# Patient Record
(demographics unavailable — no encounter records)

---

## 2018-07-11 NOTE — EMERGENCY ROOM REPORT
History of Present Illness


General


Chief Complaint:  Lower Back Pain or Injury


Source:  Patient





Present Illness


HPI


22-year-old male presents emergency department complaining of acute onset 10 

out of 10 in severity low back pain 1 week.  Patient states that he was at the 

gym doing arm curls and after he put the weights down he felt sharp pain in his 

back.  Patient states pain is worse when he is slightly bent forward Or sitting 

for long period of time.  Patient denies previous injury to this area of his 

back.  Patient states the pain does not radiate down his leg but it does 

radiate into the buttocks bilaterally.  Denies numbness tingling or loss of 

sensation or gross motor movements of the extremities, incontinence of bowel or 

bladder. . Denies Hematuria, CP, Palpitations, LOC, AMS, dizziness, Changes in 

Vision, weakness or a sudden severe headache.


Allergies:  


Coded Allergies:  


     Shrimp (Verified  Allergy, Intermediate, swollen lips, itch, 3/2/14)





Patient History


Past Medical History:  see triage record


Past Surgical History:  none


Pertinent Family History:  none


Immunizations:  UTD


Reviewed Nursing Documentation:  PMH: Agreed; PSxH: Agreed





Nursing Documentation-PMH


Past Medical History:  No History, Except For


Hx Asthma:  Yes





Review of Systems


All Other Systems:  negative except mentioned in HPI





Physical Exam





Vital Signs








  Date Time  Temp Pulse Resp B/P (MAP) Pulse Ox O2 Delivery O2 Flow Rate FiO2


 


7/11/18 17:15 98.2 82 18 122/5 96 Room Air  





 98.2       








Sp02 EP Interpretation:  reviewed, normal


General Appearance:  alert, GCS 15, non-toxic, moderate distress


Head:  normocephalic, atraumatic


ENT:  hearing grossly normal, normal voice


Neck:  full range of motion


Respiratory:  lungs clear, normal breath sounds, speaking full sentences


Cardiovascular #1:  regular rate, rhythm


Genitourinary:  normal inspection, no CVA tenderness


Musculoskeletal:  back normal, gait/station normal, normal range of motion, 

tender - * Mild  Tenderness to palpation to paraspinal muscles of the Lumbar 

area and upper gluteal area bilaterally,  with no obvious midline tenderness.


Neurologic:  alert, oriented x3, responsive, motor strength/tone normal, 

sensory intact, speech normal, grossly normal


Psychiatric:  judgement/insight normal


Skin:  normal color, no rash, warm/dry, well hydrated





Medical Decision Making


PA Attestation


Dr. malin is my supervising Physician whom patient management has been 

discussed with.


Diagnostic Impression:  


 Primary Impression:  


 Lumbosacral pain


 Additional Impression:  


 Lumbosacral strain


 Qualified Codes:  S39.012A - Strain of muscle, fascia and tendon of lower back

, initial encounter


ER Course


22-year-old male presents emergency department complaining of acute onset 10 

out of 10 in severity low back pain 1 week.  Patient states that he was at the 

gym doing arm curls and after he put the weights down he felt sharp pain in his 

back.  Patient states pain is worse when he is slightly bent forward Or sitting 

for long period of time.  Patient denies previous injury to this area of his 

back.  Patient states the pain does not radiate down his leg but it does 

radiate into the buttocks bilaterally.  Denies numbness tingling or loss of 

sensation or gross motor movements of the extremities, incontinence of bowel or 

bladder. . Denies Hematuria, CP, Palpitations, LOC, AMS, dizziness, Changes in 

Vision, weakness or a sudden severe headache. 





Ddx considered: epidural abscess, fracture, sprain/strain, meningitis, spinal 

chord injury, sciatica, cauda equina, Pyelonephritis, renal calculi just to 

name a few.  





Vital signs reviewed and are WNL during ED visit.





Pt. is afebrile with no signs of infection


No new symptoms, and denies recent trauma.


No saddle anesthesia noted, Pt. denies incontinence 


Neurovascular is intact


ROM is limited due to pain


* Mild  Tenderness to palpation to paraspinal muscles of the Lumbar area and 

upper gluteal area bilaterally,  with no obvious midline tenderness. 


*Pt. describes pain today as moderate and radiates across the lower back. 





ORDERS: none warranted at this time.  





INTERVENTIONS: 





- Soma PO


-Lidoderm TP





-I do not identify an emergent condition at this time. With current presentation

,  pt. is stable for close outpatient follow up and conservative treatment.  D/

w pt. to return promptly to ED with worsening or new symptoms.- Pt. (and or 

responsible party) verbalizes' understanding and agreement with proposed 

treatment plan.proposed treatment plan. 





DISCHARGE: At this time pt. is stable for d/c to home. Will provide printed 

patient care instructions, and any necessary prescriptions. Care plan and 

follow up instructions have been discussed with the patient prior to discharge.





Last Vital Signs








  Date Time  Temp Pulse Resp B/P (MAP) Pulse Ox O2 Delivery O2 Flow Rate FiO2


 


7/11/18 17:15 98.2 82 18 122/5 96 Room Air  





 98.2       








Disposition:  HOME, SELF-CARE


Condition:  Stable


Scripts


Albuterol Sulfate* (ALBUTEROL SULFATE MDI*) 8.5 Gm Hfa.aer.ad


2 PUFF INH Q4H, #1 INH 2 Refills


   Prov: Karissa Samuels         7/11/18 


Ibuprofen* (MOTRIN*) 600 Mg Tablet


600 MG ORAL THREE TIMES A DAY, #20 TAB 0 Refills


   Prov: Karissa Samuels         7/11/18 


Methocarbamol* (ROBAXIN*) 500 Mg Tablet


1000 MG PO TID, #42 TAB 0 Refills


   Prov: Karissa Samuels         7/11/18 


Lidocaine (Lidoderm) 1 Each Adh..patch


1 PATCH TOPIC DAILY, #20 PATCH 0 Refills


   Patch(es) may remain in place for up to 12 hours in any 24-hour


   period.


   Prov: Karissa Samuels         7/11/18


Patient Instructions:  Lumbosacral Strain, Back Pain, Adult





Additional Instructions:  


Take medications as directed. 





 ** Follow up with a Primary Care Provider in 3-5 days, even if your symptoms 

have resolved. ** 


--Please review list of primary care clinics, if you do not already have a 

primary care provider





Return sooner to ED if new symptoms occur, or current symptoms become worse. 


Do not drink alcohol, drive, or operate heavy machinery while taking Robaxin as 

this may cause drowsiness. 











- Please note that this Emergency Department Report was dictated using [employer] technology software, occasionally this can lead to 

erroneous entry secondary to interpretation by the dictation equipment.











Karissa Samuels Jul 11, 2018 18:02